# Patient Record
Sex: FEMALE | Race: BLACK OR AFRICAN AMERICAN | URBAN - METROPOLITAN AREA
[De-identification: names, ages, dates, MRNs, and addresses within clinical notes are randomized per-mention and may not be internally consistent; named-entity substitution may affect disease eponyms.]

---

## 2017-01-11 ENCOUNTER — HISTORICAL (OUTPATIENT)
Dept: ADMINISTRATIVE | Facility: HOSPITAL | Age: 82
End: 2017-01-11

## 2017-01-13 ENCOUNTER — HISTORICAL (OUTPATIENT)
Dept: ADMINISTRATIVE | Facility: HOSPITAL | Age: 82
End: 2017-01-13

## 2017-02-21 ENCOUNTER — HISTORICAL (OUTPATIENT)
Dept: ADMINISTRATIVE | Facility: HOSPITAL | Age: 82
End: 2017-02-21

## 2017-03-23 ENCOUNTER — HISTORICAL (OUTPATIENT)
Dept: RADIOLOGY | Facility: HOSPITAL | Age: 82
End: 2017-03-23

## 2017-03-31 ENCOUNTER — HISTORICAL (OUTPATIENT)
Dept: RADIOLOGY | Facility: HOSPITAL | Age: 82
End: 2017-03-31

## 2017-08-17 ENCOUNTER — HISTORICAL (OUTPATIENT)
Dept: LAB | Facility: HOSPITAL | Age: 82
End: 2017-08-17

## 2017-09-26 ENCOUNTER — HISTORICAL (OUTPATIENT)
Dept: RADIOLOGY | Facility: HOSPITAL | Age: 82
End: 2017-09-26

## 2018-04-18 ENCOUNTER — HISTORICAL (OUTPATIENT)
Dept: ADMINISTRATIVE | Facility: HOSPITAL | Age: 83
End: 2018-04-18

## 2018-05-25 ENCOUNTER — HISTORICAL (OUTPATIENT)
Dept: LAB | Facility: HOSPITAL | Age: 83
End: 2018-05-25

## 2018-07-05 ENCOUNTER — HISTORICAL (OUTPATIENT)
Dept: RADIOLOGY | Facility: HOSPITAL | Age: 83
End: 2018-07-05

## 2018-08-27 ENCOUNTER — HISTORICAL (OUTPATIENT)
Dept: LAB | Facility: HOSPITAL | Age: 83
End: 2018-08-27

## 2018-08-27 LAB
ABS NEUT (OLG): 3.3 X10(3)/MCL (ref 1.5–6.9)
ALBUMIN SERPL-MCNC: 3 GM/DL (ref 3.4–5)
ALBUMIN/GLOB SERPL: 0.7 RATIO
ALP SERPL-CCNC: 126 UNIT/L (ref 30–113)
ALT SERPL-CCNC: 19 UNIT/L (ref 10–45)
ANISOCYTOSIS BLD QL SMEAR: ABNORMAL
AST SERPL-CCNC: 17 UNIT/L (ref 15–37)
BILIRUB SERPL-MCNC: 0.3 MG/DL (ref 0.1–0.9)
BILIRUBIN DIRECT+TOT PNL SERPL-MCNC: 0.1 MG/DL (ref 0–0.3)
BILIRUBIN DIRECT+TOT PNL SERPL-MCNC: 0.2 MG/DL
BUN SERPL-MCNC: 30 MG/DL (ref 10–20)
CALCIUM SERPL-MCNC: 8.8 MG/DL (ref 8–10.5)
CHLORIDE SERPL-SCNC: 107 MMOL/L (ref 100–108)
CO2 SERPL-SCNC: 27 MMOL/L (ref 21–35)
CREAT SERPL-MCNC: 1.98 MG/DL (ref 0.7–1.3)
CREAT UR-MCNC: 136.8 MG/DL
ERYTHROCYTE [DISTWIDTH] IN BLOOD BY AUTOMATED COUNT: 17.5 % (ref 11.5–17)
GLOBULIN SER-MCNC: 4.3 GM/DL
GLUCOSE SERPL-MCNC: 118 MG/DL (ref 75–116)
HCT VFR BLD AUTO: 33.9 % (ref 36–48)
HGB BLD-MCNC: 10.7 GM/DL (ref 12–16)
HYPOCHROMIA BLD QL SMEAR: ABNORMAL
MCH RBC QN AUTO: 26 PG (ref 27–34)
MCHC RBC AUTO-ENTMCNC: 32 GM/DL (ref 31–36)
MCV RBC AUTO: 84 FL (ref 80–99)
PHOSPHATE SERPL-MCNC: 3.9 MG/DL (ref 2.6–4.7)
PLATELET # BLD AUTO: 144 X10(3)/MCL (ref 140–400)
PLATELET # BLD EST: ADEQUATE 10*3/UL
PMV BLD AUTO: 12 FL (ref 6.8–10)
POIKILOCYTOSIS BLD QL SMEAR: ABNORMAL
POTASSIUM SERPL-SCNC: 4 MMOL/L (ref 3.6–5.2)
PROT SERPL-MCNC: 7.3 GM/DL (ref 6.4–8.2)
PROT UR STRIP-MCNC: 15 MG/DL (ref 0–10)
RBC # BLD AUTO: 4.06 X10(6)/MCL (ref 4.2–5.4)
RBC MORPH BLD: ABNORMAL
SODIUM SERPL-SCNC: 144 MMOL/L (ref 135–145)
WBC # SPEC AUTO: 5.2 X10(3)/MCL (ref 4.5–11.5)

## 2018-08-31 ENCOUNTER — HISTORICAL (OUTPATIENT)
Dept: LAB | Facility: HOSPITAL | Age: 83
End: 2018-08-31

## 2018-08-31 LAB
ALBUMIN SERPL-MCNC: 3.2 GM/DL (ref 3.4–5)
ALBUMIN/GLOB SERPL: 0.7 RATIO
ALP SERPL-CCNC: 131 UNIT/L (ref 30–113)
ALT SERPL-CCNC: 21 UNIT/L (ref 10–45)
AST SERPL-CCNC: 16 UNIT/L (ref 15–37)
BILIRUB SERPL-MCNC: 0.2 MG/DL (ref 0.1–0.9)
BILIRUBIN DIRECT+TOT PNL SERPL-MCNC: 0.1 MG/DL
BILIRUBIN DIRECT+TOT PNL SERPL-MCNC: 0.1 MG/DL (ref 0–0.3)
BUN SERPL-MCNC: 34 MG/DL (ref 10–20)
CALCIUM SERPL-MCNC: 8.8 MG/DL (ref 8–10.5)
CHLORIDE SERPL-SCNC: 107 MMOL/L (ref 100–108)
CO2 SERPL-SCNC: 26 MMOL/L (ref 21–35)
CREAT SERPL-MCNC: 2.02 MG/DL (ref 0.7–1.3)
GLOBULIN SER-MCNC: 4.5 GM/DL
GLUCOSE SERPL-MCNC: 133 MG/DL (ref 75–116)
POTASSIUM SERPL-SCNC: 3.7 MMOL/L (ref 3.6–5.2)
PROT SERPL-MCNC: 7.7 GM/DL (ref 6.4–8.2)
SODIUM SERPL-SCNC: 144 MMOL/L (ref 135–145)

## 2018-09-27 ENCOUNTER — HISTORICAL (OUTPATIENT)
Dept: LAB | Facility: HOSPITAL | Age: 83
End: 2018-09-27

## 2018-09-27 LAB
ABS NEUT (OLG): 3.3 X10(3)/MCL (ref 1.5–6.9)
BASOPHILS # BLD AUTO: 0 X10(3)/MCL (ref 0–0.1)
BASOPHILS NFR BLD AUTO: 0 % (ref 0–1)
BUN SERPL-MCNC: 35 MG/DL (ref 10–20)
CALCIUM SERPL-MCNC: 8.4 MG/DL (ref 8–10.5)
CHLORIDE SERPL-SCNC: 105 MMOL/L (ref 100–108)
CO2 SERPL-SCNC: 26 MMOL/L (ref 21–35)
CREAT SERPL-MCNC: 1.99 MG/DL (ref 0.7–1.3)
CREAT/UREA NIT SERPL: 18
EOSINOPHIL # BLD AUTO: 0 X10(3)/MCL (ref 0–0.6)
EOSINOPHIL NFR BLD AUTO: 1 % (ref 0–5)
ERYTHROCYTE [DISTWIDTH] IN BLOOD BY AUTOMATED COUNT: 18 % (ref 11.5–17)
GLUCOSE SERPL-MCNC: 143 MG/DL (ref 75–116)
HCT VFR BLD AUTO: 35.8 % (ref 36–48)
HGB BLD-MCNC: 11.4 GM/DL (ref 12–16)
LYMPHOCYTES # BLD AUTO: 1.2 X10(3)/MCL (ref 0.5–4.1)
LYMPHOCYTES NFR BLD AUTO: 23.9 % (ref 15–40)
MCH RBC QN AUTO: 26 PG (ref 27–34)
MCHC RBC AUTO-ENTMCNC: 32 GM/DL (ref 31–36)
MCV RBC AUTO: 83 FL (ref 80–99)
MONOCYTES # BLD AUTO: 0.3 X10(3)/MCL (ref 0–1.1)
MONOCYTES NFR BLD AUTO: 6 % (ref 4–12)
NEUTROPHILS # BLD AUTO: 3.3 X10(3)/MCL (ref 1.5–6.9)
NEUTROPHILS NFR BLD AUTO: 68 % (ref 43–75)
PLATELET # BLD AUTO: 160 X10(3)/MCL (ref 140–400)
PLATELET # BLD EST: ADEQUATE 10*3/UL
PMV BLD AUTO: 11.9 FL (ref 6.8–10)
POTASSIUM SERPL-SCNC: 4.2 MMOL/L (ref 3.6–5.2)
RBC # BLD AUTO: 4.33 X10(6)/MCL (ref 4.2–5.4)
RBC MORPH BLD: NORMAL
SODIUM SERPL-SCNC: 142 MMOL/L (ref 135–145)
WBC # SPEC AUTO: 4.9 X10(3)/MCL (ref 4.5–11.5)

## 2019-01-18 ENCOUNTER — HISTORICAL (OUTPATIENT)
Dept: LAB | Facility: HOSPITAL | Age: 84
End: 2019-01-18

## 2019-02-19 ENCOUNTER — HISTORICAL (OUTPATIENT)
Dept: RADIOLOGY | Facility: HOSPITAL | Age: 84
End: 2019-02-19

## 2019-05-07 ENCOUNTER — HISTORICAL (OUTPATIENT)
Dept: LAB | Facility: HOSPITAL | Age: 84
End: 2019-05-07

## 2019-05-07 LAB
CREAT UR-MCNC: 112.6 MG/DL
EST. AVERAGE GLUCOSE BLD GHB EST-MCNC: 169 MG/DL
HBA1C MFR BLD: 7.5 % (ref 4.8–6)
MICROALBUMIN UR-MCNC: 1.8 MG/DL (ref 0.1–2)
MICROALBUMIN/CREAT RATIO PNL UR: 16 MG/GM CR (ref 0–30)

## 2019-06-07 ENCOUNTER — HISTORICAL (OUTPATIENT)
Dept: LAB | Facility: HOSPITAL | Age: 84
End: 2019-06-07

## 2019-06-07 LAB
ABS NEUT (OLG): 3.4 X10(3)/MCL (ref 1.5–6.9)
ALBUMIN SERPL-MCNC: 3.3 GM/DL (ref 3.4–5)
ALBUMIN/GLOB SERPL: 0.9 RATIO
ALP SERPL-CCNC: 156 UNIT/L (ref 30–113)
ALT SERPL-CCNC: 20 UNIT/L (ref 10–45)
AST SERPL-CCNC: 18 UNIT/L (ref 15–37)
BASOPHILS # BLD AUTO: 0 X10(3)/MCL (ref 0–0.1)
BASOPHILS NFR BLD AUTO: 0 % (ref 0–1)
BILIRUB SERPL-MCNC: 0.3 MG/DL (ref 0.1–0.9)
BILIRUBIN DIRECT+TOT PNL SERPL-MCNC: 0.1 MG/DL (ref 0–0.3)
BILIRUBIN DIRECT+TOT PNL SERPL-MCNC: 0.2 MG/DL
BUN SERPL-MCNC: 23 MG/DL (ref 10–20)
CALCIUM SERPL-MCNC: 8.9 MG/DL (ref 8–10.5)
CHLORIDE SERPL-SCNC: 107 MMOL/L (ref 100–108)
CO2 SERPL-SCNC: 27 MMOL/L (ref 21–35)
CREAT SERPL-MCNC: 1.44 MG/DL (ref 0.7–1.3)
EOSINOPHIL # BLD AUTO: 0.1 X10(3)/MCL (ref 0–0.6)
EOSINOPHIL NFR BLD AUTO: 3 % (ref 0–5)
ERYTHROCYTE [DISTWIDTH] IN BLOOD BY AUTOMATED COUNT: 16 % (ref 11.5–17)
GLOBULIN SER-MCNC: 3.8 GM/DL
GLUCOSE SERPL-MCNC: 181 MG/DL (ref 75–116)
HCT VFR BLD AUTO: 39.4 % (ref 36–48)
HGB BLD-MCNC: 12.7 GM/DL (ref 12–16)
IMM GRANULOCYTES # BLD AUTO: 0.02 10*3/UL (ref 0–0.02)
IMM GRANULOCYTES NFR BLD AUTO: 0.4 % (ref 0–0.43)
LYMPHOCYTES # BLD AUTO: 1.3 X10(3)/MCL (ref 0.5–4.1)
LYMPHOCYTES NFR BLD AUTO: 25 % (ref 15–40)
MCH RBC QN AUTO: 27 PG (ref 27–34)
MCHC RBC AUTO-ENTMCNC: 32 GM/DL (ref 31–36)
MCV RBC AUTO: 84 FL (ref 80–99)
MONOCYTES # BLD AUTO: 0.4 X10(3)/MCL (ref 0–1.1)
MONOCYTES NFR BLD AUTO: 7 % (ref 4–12)
NEUTROPHILS # BLD AUTO: 3.4 X10(3)/MCL (ref 1.5–6.9)
NEUTROPHILS NFR BLD AUTO: 65 % (ref 43–75)
PLATELET # BLD AUTO: 136 X10(3)/MCL (ref 140–400)
PLATELET # BLD EST: ABNORMAL 10*3/UL
PMV BLD AUTO: 11.1 FL (ref 6.8–10)
POTASSIUM SERPL-SCNC: 3.9 MMOL/L (ref 3.6–5.2)
PROT SERPL-MCNC: 7.1 GM/DL (ref 6.4–8.2)
RBC # BLD AUTO: 4.7 X10(6)/MCL (ref 4.2–5.4)
RBC MORPH BLD: NORMAL
SODIUM SERPL-SCNC: 143 MMOL/L (ref 135–145)
WBC # SPEC AUTO: 5.3 X10(3)/MCL (ref 4.5–11.5)

## 2019-11-07 ENCOUNTER — HISTORICAL (OUTPATIENT)
Dept: RADIOLOGY | Facility: HOSPITAL | Age: 84
End: 2019-11-07

## 2019-12-04 ENCOUNTER — HISTORICAL (OUTPATIENT)
Dept: RADIOLOGY | Facility: HOSPITAL | Age: 84
End: 2019-12-04

## 2019-12-04 LAB
ABS NEUT (OLG): 5.1 X10(3)/MCL (ref 1.5–6.9)
ALBUMIN SERPL-MCNC: 3.1 GM/DL (ref 3.4–5)
ALBUMIN/GLOB SERPL: 0.6 RATIO
ALP SERPL-CCNC: 142 UNIT/L (ref 30–113)
ALT SERPL-CCNC: 20 UNIT/L (ref 10–45)
AST SERPL-CCNC: 16 UNIT/L (ref 15–37)
BILIRUB SERPL-MCNC: 0.3 MG/DL (ref 0.1–0.9)
BILIRUBIN DIRECT+TOT PNL SERPL-MCNC: 0.1 MG/DL (ref 0–0.3)
BILIRUBIN DIRECT+TOT PNL SERPL-MCNC: 0.2 MG/DL
BUN SERPL-MCNC: 30 MG/DL (ref 10–20)
CALCIUM SERPL-MCNC: 9.1 MG/DL (ref 8–10.5)
CHLORIDE SERPL-SCNC: 107 MMOL/L (ref 100–108)
CO2 SERPL-SCNC: 29 MMOL/L (ref 21–35)
CREAT SERPL-MCNC: 1.73 MG/DL (ref 0.7–1.3)
ERYTHROCYTE [DISTWIDTH] IN BLOOD BY AUTOMATED COUNT: 15.2 % (ref 11.5–17)
GLOBULIN SER-MCNC: 4.9 GM/DL
GLUCOSE SERPL-MCNC: 149 MG/DL (ref 75–116)
HCT VFR BLD AUTO: 37.3 % (ref 36–48)
HGB BLD-MCNC: 11.9 GM/DL (ref 12–16)
MCH RBC QN AUTO: 27 PG (ref 27–34)
MCHC RBC AUTO-ENTMCNC: 32 GM/DL (ref 31–36)
MCV RBC AUTO: 86 FL (ref 80–99)
PLATELET # BLD AUTO: 198 X10(3)/MCL (ref 140–400)
PMV BLD AUTO: 10.5 FL (ref 6.8–10)
POTASSIUM SERPL-SCNC: 3.9 MMOL/L (ref 3.6–5.2)
PROT SERPL-MCNC: 8 GM/DL (ref 6.4–8.2)
RBC # BLD AUTO: 4.35 X10(6)/MCL (ref 4.2–5.4)
SODIUM SERPL-SCNC: 144 MMOL/L (ref 135–145)
WBC # SPEC AUTO: 7.6 X10(3)/MCL (ref 4.5–11.5)

## 2019-12-09 ENCOUNTER — HISTORICAL (OUTPATIENT)
Dept: LAB | Facility: HOSPITAL | Age: 84
End: 2019-12-09

## 2019-12-09 LAB
ALBUMIN SERPL-MCNC: 3 GM/DL (ref 3.4–5)
ALBUMIN/GLOB SERPL: 0.6 RATIO
ALP SERPL-CCNC: 120 UNIT/L (ref 30–113)
ALT SERPL-CCNC: 17 UNIT/L (ref 10–45)
AST SERPL-CCNC: 15 UNIT/L (ref 15–37)
BILIRUB SERPL-MCNC: 0.4 MG/DL (ref 0.1–0.9)
BILIRUBIN DIRECT+TOT PNL SERPL-MCNC: 0.1 MG/DL (ref 0–0.3)
BILIRUBIN DIRECT+TOT PNL SERPL-MCNC: 0.3 MG/DL
BUN SERPL-MCNC: 28 MG/DL (ref 10–20)
CALCIUM SERPL-MCNC: 9.6 MG/DL (ref 8–10.5)
CHLORIDE SERPL-SCNC: 104 MMOL/L (ref 100–108)
CHOLEST SERPL-MCNC: 161 MG/DL (ref 140–200)
CHOLEST/HDLC SERPL: 2 MG/DL (ref 0–8)
CO2 SERPL-SCNC: 26 MMOL/L (ref 21–35)
CREAT SERPL-MCNC: 1.88 MG/DL (ref 0.7–1.3)
GLOBULIN SER-MCNC: 4.7 GM/DL
GLUCOSE SERPL-MCNC: 121 MG/DL (ref 75–116)
HDLC SERPL-MCNC: 67 MG/DL (ref 35–59)
LDLC SERPL CALC-MCNC: 79 MG/DL (ref 100–129)
POTASSIUM SERPL-SCNC: 3.9 MMOL/L (ref 3.6–5.2)
PROT SERPL-MCNC: 7.7 GM/DL (ref 6.4–8.2)
SODIUM SERPL-SCNC: 143 MMOL/L (ref 135–145)
TRIGL SERPL-MCNC: 70 MG/DL (ref 35–150)
VLDLC SERPL CALC-MCNC: 14 MG/DL

## 2020-08-24 ENCOUNTER — HISTORICAL (OUTPATIENT)
Dept: LAB | Facility: HOSPITAL | Age: 85
End: 2020-08-24

## 2020-11-10 ENCOUNTER — HISTORICAL (OUTPATIENT)
Dept: ADMINISTRATIVE | Facility: HOSPITAL | Age: 85
End: 2020-11-10

## 2020-11-12 ENCOUNTER — HISTORICAL (OUTPATIENT)
Dept: SURGERY | Facility: HOSPITAL | Age: 85
End: 2020-11-12

## 2020-12-31 ENCOUNTER — HOSPITAL ENCOUNTER (OUTPATIENT)
Dept: MEDSURG UNIT | Facility: HOSPITAL | Age: 85
End: 2021-01-02
Admitting: NURSE PRACTITIONER

## 2020-12-31 LAB
ABS NEUT (OLG): 4.21 X10(3)/MCL (ref 2.1–9.2)
ALBUMIN SERPL-MCNC: 3.1 GM/DL (ref 3.4–4.8)
ALBUMIN/GLOB SERPL: 0.7 RATIO (ref 1.1–2)
ALP SERPL-CCNC: 151 UNIT/L (ref 40–150)
ALT SERPL-CCNC: 35 UNIT/L (ref 0–55)
AST SERPL-CCNC: 28 UNIT/L (ref 5–34)
BASOPHILS # BLD AUTO: 0 X10(3)/MCL (ref 0–0.2)
BASOPHILS NFR BLD AUTO: 0 %
BILIRUB SERPL-MCNC: 0.6 MG/DL
BILIRUBIN DIRECT+TOT PNL SERPL-MCNC: 0.3 MG/DL (ref 0–0.5)
BILIRUBIN DIRECT+TOT PNL SERPL-MCNC: 0.3 MG/DL (ref 0–0.8)
BNP BLD-MCNC: 123 PG/ML (ref 0–100)
BNP BLD-MCNC: 125.3 PG/ML
BUN SERPL-MCNC: 27.9 MG/DL (ref 9.8–20.1)
CALCIUM SERPL-MCNC: 8.8 MG/DL (ref 8.4–10.2)
CHLORIDE SERPL-SCNC: 103 MMOL/L (ref 98–107)
CO2 SERPL-SCNC: 25 MMOL/L (ref 23–31)
CREAT SERPL-MCNC: 1.83 MG/DL (ref 0.55–1.02)
EOSINOPHIL # BLD AUTO: 0.2 X10(3)/MCL (ref 0–0.9)
EOSINOPHIL NFR BLD AUTO: 3 %
ERYTHROCYTE [DISTWIDTH] IN BLOOD BY AUTOMATED COUNT: 15.8 % (ref 11.5–17)
GLOBULIN SER-MCNC: 4.7 GM/DL (ref 2.4–3.5)
GLUCOSE SERPL-MCNC: 149 MG/DL (ref 82–115)
HCT VFR BLD AUTO: 32.1 % (ref 37–47)
HGB BLD-MCNC: 10.4 GM/DL (ref 12–16)
LYMPHOCYTES # BLD AUTO: 0.9 X10(3)/MCL (ref 0.6–4.6)
LYMPHOCYTES NFR BLD AUTO: 15 %
MCH RBC QN AUTO: 27.5 PG (ref 27–31)
MCHC RBC AUTO-ENTMCNC: 32.4 GM/DL (ref 33–36)
MCV RBC AUTO: 84.9 FL (ref 80–94)
MONOCYTES # BLD AUTO: 0.7 X10(3)/MCL (ref 0.1–1.3)
MONOCYTES NFR BLD AUTO: 11 %
NEUTROPHILS # BLD AUTO: 4.21 X10(3)/MCL (ref 2.1–9.2)
NEUTROPHILS NFR BLD AUTO: 71 %
PLATELET # BLD AUTO: 172 X10(3)/MCL (ref 130–400)
PMV BLD AUTO: 11.3 FL (ref 9.4–12.4)
POC TROPONIN: 0.01 NG/ML (ref 0–0.08)
POTASSIUM SERPL-SCNC: 3.9 MMOL/L (ref 3.5–5.1)
PROT SERPL-MCNC: 7.8 GM/DL (ref 5.8–7.6)
RBC # BLD AUTO: 3.78 X10(6)/MCL (ref 4.2–5.4)
SODIUM SERPL-SCNC: 142 MMOL/L (ref 136–145)
TROPONIN I SERPL-MCNC: 0.01 NG/ML (ref 0–0.04)
WBC # SPEC AUTO: 6 X10(3)/MCL (ref 4.5–11.5)

## 2021-01-01 LAB
CK MB SERPL-MCNC: 1.9 NG/ML
CK MB SERPL-MCNC: 2.1 NG/ML
CK SERPL-CCNC: 179 U/L (ref 29–168)
CK SERPL-CCNC: 192 U/L (ref 29–168)
SARS-COV-2 RNA RESP QL NAA+PROBE: NOT DETECTED
TROPONIN I SERPL-MCNC: 0.01 NG/ML (ref 0–0.04)
TROPONIN I SERPL-MCNC: 0.01 NG/ML (ref 0–0.04)

## 2021-01-02 LAB
BUN SERPL-MCNC: 26 MG/DL (ref 9.8–20.1)
CALCIUM SERPL-MCNC: 8.1 MG/DL (ref 8.4–10.2)
CHLORIDE SERPL-SCNC: 104 MMOL/L (ref 98–107)
CO2 SERPL-SCNC: 22 MMOL/L (ref 23–31)
CREAT SERPL-MCNC: 1.6 MG/DL (ref 0.55–1.02)
CREAT/UREA NIT SERPL: 16
EST CREAT CLEARANCE SER (OHS): 21.64 ML/MIN
GLUCOSE SERPL-MCNC: 109 MG/DL (ref 82–115)
POTASSIUM SERPL-SCNC: 4.7 MMOL/L (ref 3.5–5.1)
RESTICK: NORMAL
SODIUM SERPL-SCNC: 139 MMOL/L (ref 136–145)

## 2021-01-15 ENCOUNTER — HISTORICAL (OUTPATIENT)
Dept: ADMINISTRATIVE | Facility: HOSPITAL | Age: 86
End: 2021-01-15

## 2021-01-15 LAB — SARS-COV-2 RNA RESP QL NAA+PROBE: NOT DETECTED

## 2021-01-19 ENCOUNTER — HISTORICAL (OUTPATIENT)
Dept: SURGERY | Facility: HOSPITAL | Age: 86
End: 2021-01-19

## 2021-03-30 ENCOUNTER — HISTORICAL (OUTPATIENT)
Dept: LAB | Facility: HOSPITAL | Age: 86
End: 2021-03-30

## 2021-03-30 LAB
ABS NEUT (OLG): 3.2 X10(3)/MCL (ref 1.5–6.9)
ALBUMIN SERPL-MCNC: 3.5 GM/DL (ref 3.4–4.8)
ALBUMIN/GLOB SERPL: 0.9 RATIO (ref 1.1–2)
ALP SERPL-CCNC: 112 UNIT/L (ref 40–150)
ALT SERPL-CCNC: 17 UNIT/L (ref 0–55)
AST SERPL-CCNC: 15 UNIT/L (ref 5–34)
BASOPHILS # BLD AUTO: 0 X10(3)/MCL (ref 0–0.1)
BASOPHILS NFR BLD AUTO: 0 % (ref 0–1)
BILIRUB SERPL-MCNC: 0.3 MG/DL
BILIRUBIN DIRECT+TOT PNL SERPL-MCNC: 0.1 MG/DL (ref 0–0.5)
BILIRUBIN DIRECT+TOT PNL SERPL-MCNC: 0.2 MG/DL (ref 0–0.8)
BUN SERPL-MCNC: 32 MG/DL (ref 9.8–20.1)
CALCIUM SERPL-MCNC: 9.1 MG/DL (ref 8.4–10.2)
CHLORIDE SERPL-SCNC: 107 MMOL/L (ref 98–107)
CHOLEST SERPL-MCNC: 157 MG/DL
CHOLEST/HDLC SERPL: 3 {RATIO} (ref 0–5)
CO2 SERPL-SCNC: 27 MMOL/L (ref 23–31)
CREAT SERPL-MCNC: 1.94 MG/DL (ref 0.55–1.02)
CREAT UR-MCNC: 93.2 MG/DL (ref 45–106)
EOSINOPHIL # BLD AUTO: 0.3 X10(3)/MCL (ref 0–0.6)
EOSINOPHIL NFR BLD AUTO: 5 % (ref 0–5)
ERYTHROCYTE [DISTWIDTH] IN BLOOD BY AUTOMATED COUNT: 16.5 % (ref 11.5–17)
EST. AVERAGE GLUCOSE BLD GHB EST-MCNC: 203 MG/DL
GLOBULIN SER-MCNC: 4.1 GM/DL (ref 2.4–3.5)
GLUCOSE SERPL-MCNC: 104 MG/DL (ref 82–115)
HBA1C MFR BLD: 8.7 %
HCT VFR BLD AUTO: 37.5 % (ref 36–48)
HDLC SERPL-MCNC: 53 MG/DL (ref 35–60)
HGB BLD-MCNC: 11.8 GM/DL (ref 12–16)
IMM GRANULOCYTES # BLD AUTO: 0.01 10*3/UL (ref 0–0.02)
IMM GRANULOCYTES NFR BLD AUTO: 0.2 % (ref 0–0.43)
LDLC SERPL CALC-MCNC: 80 MG/DL (ref 50–140)
LYMPHOCYTES # BLD AUTO: 1.3 X10(3)/MCL (ref 0.5–4.1)
LYMPHOCYTES NFR BLD AUTO: 25 % (ref 15–40)
MCH RBC QN AUTO: 27 PG (ref 27–34)
MCHC RBC AUTO-ENTMCNC: 32 GM/DL (ref 31–36)
MCV RBC AUTO: 86 FL (ref 80–99)
MICROALBUMIN UR-MCNC: 109.9 UG/ML
MICROALBUMIN/CREAT RATIO PNL UR: 117.9 MG/GM CR (ref 0–30)
MONOCYTES # BLD AUTO: 0.4 X10(3)/MCL (ref 0–1.1)
MONOCYTES NFR BLD AUTO: 8 % (ref 4–12)
NEUTROPHILS # BLD AUTO: 3.2 X10(3)/MCL (ref 1.5–6.9)
NEUTROPHILS NFR BLD AUTO: 61 % (ref 43–75)
PLATELET # BLD AUTO: 175 X10(3)/MCL (ref 140–400)
PMV BLD AUTO: 11.3 FL (ref 6.8–10)
POTASSIUM SERPL-SCNC: 4.1 MMOL/L (ref 3.5–5.1)
PROT SERPL-MCNC: 7.6 GM/DL (ref 5.8–7.6)
RBC # BLD AUTO: 4.36 X10(6)/MCL (ref 4.2–5.4)
SODIUM SERPL-SCNC: 143 MMOL/L (ref 136–145)
TRIGL SERPL-MCNC: 120 MG/DL (ref 37–140)
VLDLC SERPL CALC-MCNC: 24 MG/DL
WBC # SPEC AUTO: 5.3 X10(3)/MCL (ref 4.5–11.5)

## 2021-04-06 ENCOUNTER — HISTORICAL (OUTPATIENT)
Dept: LAB | Facility: HOSPITAL | Age: 86
End: 2021-04-06

## 2021-04-06 LAB
ABS NEUT (OLG): 3.5 X10(3)/MCL (ref 1.5–6.9)
ALBUMIN SERPL-MCNC: 3.7 GM/DL (ref 3.4–4.8)
ALBUMIN/GLOB SERPL: 0.9 RATIO (ref 1.1–2)
ALP SERPL-CCNC: 138 UNIT/L (ref 40–150)
ALT SERPL-CCNC: 18 UNIT/L (ref 0–55)
AST SERPL-CCNC: 13 UNIT/L (ref 5–34)
BASOPHILS # BLD AUTO: 0 X10(3)/MCL (ref 0–0.1)
BASOPHILS NFR BLD AUTO: 0 % (ref 0–1)
BILIRUB SERPL-MCNC: 0.4 MG/DL
BILIRUBIN DIRECT+TOT PNL SERPL-MCNC: 0.2 MG/DL (ref 0–0.5)
BILIRUBIN DIRECT+TOT PNL SERPL-MCNC: 0.2 MG/DL (ref 0–0.8)
BUN SERPL-MCNC: 35 MG/DL (ref 9.8–20.1)
CALCIUM SERPL-MCNC: 9.5 MG/DL (ref 8.4–10.2)
CHLORIDE SERPL-SCNC: 106 MMOL/L (ref 98–107)
CHOLEST SERPL-MCNC: 179 MG/DL
CHOLEST/HDLC SERPL: 4 {RATIO} (ref 0–5)
CO2 SERPL-SCNC: 29 MMOL/L (ref 23–31)
CREAT SERPL-MCNC: 1.87 MG/DL (ref 0.55–1.02)
CREAT UR-MCNC: 120.4 MG/DL (ref 45–106)
EOSINOPHIL # BLD AUTO: 0.3 X10(3)/MCL (ref 0–0.6)
EOSINOPHIL NFR BLD AUTO: 4 % (ref 0–5)
ERYTHROCYTE [DISTWIDTH] IN BLOOD BY AUTOMATED COUNT: 16.1 % (ref 11.5–17)
GLOBULIN SER-MCNC: 4.1 GM/DL (ref 2.4–3.5)
GLUCOSE SERPL-MCNC: 125 MG/DL (ref 82–115)
HCT VFR BLD AUTO: 40.9 % (ref 36–48)
HDLC SERPL-MCNC: 42 MG/DL (ref 35–60)
HGB BLD-MCNC: 12.4 GM/DL (ref 12–16)
IMM GRANULOCYTES # BLD AUTO: 0.04 10*3/UL (ref 0–0.02)
IMM GRANULOCYTES NFR BLD AUTO: 0.6 % (ref 0–0.43)
LDLC SERPL CALC-MCNC: 91 MG/DL (ref 50–140)
LYMPHOCYTES # BLD AUTO: 2.7 X10(3)/MCL (ref 0.5–4.1)
LYMPHOCYTES NFR BLD AUTO: 38 % (ref 15–40)
MCH RBC QN AUTO: 26 PG (ref 27–34)
MCHC RBC AUTO-ENTMCNC: 30 GM/DL (ref 31–36)
MCV RBC AUTO: 87 FL (ref 80–99)
MONOCYTES # BLD AUTO: 0.6 X10(3)/MCL (ref 0–1.1)
MONOCYTES NFR BLD AUTO: 8 % (ref 4–12)
NEUTROPHILS # BLD AUTO: 3.5 X10(3)/MCL (ref 1.5–6.9)
NEUTROPHILS NFR BLD AUTO: 50 % (ref 43–75)
PHOSPHATE SERPL-MCNC: 3.7 MG/DL (ref 2.3–4.7)
PLATELET # BLD AUTO: 199 X10(3)/MCL (ref 140–400)
PMV BLD AUTO: 11.6 FL (ref 6.8–10)
POTASSIUM SERPL-SCNC: 3.6 MMOL/L (ref 3.5–5.1)
PROT SERPL-MCNC: 7.8 GM/DL (ref 5.8–7.6)
PROT UR STRIP-MCNC: 44 MG/DL
PROT/CREAT UR-RTO: 365 MG/DL
RBC # BLD AUTO: 4.71 X10(6)/MCL (ref 4.2–5.4)
SODIUM SERPL-SCNC: 145 MMOL/L (ref 136–145)
TRIGL SERPL-MCNC: 229 MG/DL (ref 37–140)
TSH SERPL-ACNC: 3.96 UIU/ML (ref 0.35–4.94)
VLDLC SERPL CALC-MCNC: 46 MG/DL
WBC # SPEC AUTO: 7.1 X10(3)/MCL (ref 4.5–11.5)

## 2021-05-21 ENCOUNTER — HISTORICAL (OUTPATIENT)
Dept: LAB | Facility: HOSPITAL | Age: 86
End: 2021-05-21

## 2021-05-21 LAB
ALBUMIN SERPL-MCNC: 3.6 GM/DL (ref 3.4–4.8)
ALBUMIN/GLOB SERPL: 1 RATIO (ref 1.1–2)
ALP SERPL-CCNC: 125 UNIT/L (ref 40–150)
ALT SERPL-CCNC: 18 UNIT/L (ref 0–55)
AST SERPL-CCNC: 14 UNIT/L (ref 5–34)
BILIRUB SERPL-MCNC: 0.3 MG/DL
BILIRUBIN DIRECT+TOT PNL SERPL-MCNC: 0.1 MG/DL (ref 0–0.5)
BILIRUBIN DIRECT+TOT PNL SERPL-MCNC: 0.2 MG/DL (ref 0–0.8)
BNP BLD-MCNC: 262.3 PG/ML
BUN SERPL-MCNC: 38 MG/DL (ref 9.8–20.1)
CALCIUM SERPL-MCNC: 9.2 MG/DL (ref 8.4–10.2)
CHLORIDE SERPL-SCNC: 105 MMOL/L (ref 98–107)
CHOLEST SERPL-MCNC: 155 MG/DL
CHOLEST/HDLC SERPL: 4 {RATIO} (ref 0–5)
CO2 SERPL-SCNC: 31 MMOL/L (ref 23–31)
CREAT SERPL-MCNC: 2.21 MG/DL (ref 0.55–1.02)
GLOBULIN SER-MCNC: 3.7 GM/DL (ref 2.4–3.5)
GLUCOSE SERPL-MCNC: 84 MG/DL (ref 82–115)
HDLC SERPL-MCNC: 42 MG/DL (ref 35–60)
LDLC SERPL CALC-MCNC: 82 MG/DL (ref 50–140)
POTASSIUM SERPL-SCNC: 4.2 MMOL/L (ref 3.5–5.1)
PROT SERPL-MCNC: 7.3 GM/DL (ref 5.8–7.6)
SODIUM SERPL-SCNC: 146 MMOL/L (ref 136–145)
TRIGL SERPL-MCNC: 153 MG/DL (ref 37–140)
VLDLC SERPL CALC-MCNC: 31 MG/DL

## 2021-12-14 ENCOUNTER — HISTORICAL (OUTPATIENT)
Dept: ADMINISTRATIVE | Facility: HOSPITAL | Age: 86
End: 2021-12-14

## 2022-04-09 ENCOUNTER — HISTORICAL (OUTPATIENT)
Dept: ADMINISTRATIVE | Facility: HOSPITAL | Age: 87
End: 2022-04-09

## 2022-04-26 VITALS
DIASTOLIC BLOOD PRESSURE: 77 MMHG | WEIGHT: 191.81 LBS | BODY MASS INDEX: 36.21 KG/M2 | HEIGHT: 61 IN | SYSTOLIC BLOOD PRESSURE: 123 MMHG

## 2022-04-30 NOTE — H&P
Patient:   Ina Marina            MRN: [id number]            FIN: [id number]               Age:   85 years     Sex:  Female     :  1935   Associated Diagnoses:   None   Author:   Doug RUSHING, Matthew MABRY      Basic Information   Source of history:  Self, Medical record.    Present at bedside:  Family member.    Referral source:  Emergency department.    History limitation:  None.       Chief Complaint       2020 20:52 CST     Complaint of chest pain, with SOB.  Symptoms started this am, worse tonight.    2020 9:00 CST      S/p CE/IOL OS (2020).  MRx then DFE OS.    2020 8:54 CST      S/p CE/IOL OS (2020).  MRx then DFE OS.        History of Present Illness   85 year old BF with history of HTN, HLD, DM, CKD (sees Dr. Hatfield), CAD with prior MI (reports prior stents LAD and Circumflex) - coronary angiography in  showed 50% proximal LAD, 90% distal LAD, 80% stenosis of a small OM, small RCA with  in mid segment.  Also with valvular heart disease with echo in 2020 revealing moderate AS (39/22 mm Hg) with concurrent mild AI and mild to moderate TR (RVSP 44 mm Hg).  Reported history of CHF (LVEF 55-60% by echo in 2020).    Patient noted acute onset of unprovoked moderate to severe intensity right sided chest discomfort characterized as pressure with associated SOB last PM with character of discomfort similar to prior angina.  Workup in ER revealed elevated creatinine from baseline (now 1.83), hemoglobin 10.4, troponin negative x 2.  EKG showed NSR with NSSTWA.  BNP was mildly elevated (125).  Patient denies recurrent discomfort since treatment in ER which included NTG paste and supplemental O2.  Family asking if she can be discharged home with supplemental O2.      Review of Systems   Constitutional:  Fatigue, No fever, No chills.    Eye:  Negative.    Ear/Nose/Mouth/Throat:  Negative.    Respiratory:  Shortness of breath, No cough, No hemoptysis.    Cardiovascular:   Peripheral edema, No palpitations.         Chest pain: Right sided.    Gastrointestinal:  Negative.    Genitourinary:  Negative.    Hematology/Lymphatics:  Negative.    Endocrine:  Negative.    Immunologic:  Negative.    Musculoskeletal:  No claudication, No trauma.    Integumentary:  Negative.    Neurologic:  Negative.    Psychiatric:  Negative.       Health Status   Allergies:    Allergic Reactions (Selected)  No Known Allergies,    Allergies (1) Active Reaction  No Known Allergies None Documented     Current medications:  (Selected)   Inpatient Medications  Ordered  Benadryl: 25 mg, form: Cap, Oral, Once a day (at bedtime) PRN for insomnia, first dose 01/01/21 3:30:00 CST  Benadryl: 25 mg, form: Cap, Oral, q4hr PRN for itching, first dose 01/01/21 3:30:00 CST  Maalox Antacid with Anti-Gas: 30 mL, form: Susp, Oral, q4hr PRN for dyspepsia, first dose 01/01/21 3:30:00 CST  Milk of Magnesia: 30 mL, form: Susp, Oral, Daily PRN for constipation, first dose 01/01/21 3:30:00 CST  Norco  5 mg-325 mg oral tablet: 1 tab(s), form: Tab, Oral, q4hr PRN for pain, moderate, first dose 01/01/21 3:30:00 CST  Norco  5 mg-325 mg oral tablet: 2 tab(s), form: Tab, Oral, q4hr PRN for pain, severe, first dose 01/01/21 3:30:00 CST  Phenergan: 12.5 mg, form: Injection, IM, q6hr PRN for nausea/vomiting, first dose 01/01/21 3:30:00 CST  Senokot S: 1 tab(s), form: Tab, Oral, BID PRN for constipation, first dose 01/01/21 3:30:00 CST, choose only if unrelieved by Milk of Magnesia or choose first if ordered alone  Tylenol: 1,000 mg, form: Tab, Oral, q6hr PRN for pain, mild, first dose 01/01/21 3:30:00 CST, or fever; No more than 4 grams in 24 hours  Tylenol: 650 mg, form: Supp, [place], q6hr PRN for pain, first dose 01/01/21 3:30:00 CST, mild or fever if patient unable to swallow; No more than 4 grams in 24 hours  Xanax: 0.25 mg, form: Tab, Oral, q6hr PRN for anxiety, first dose 01/01/21 3:30:00 CST  Xylocaine Jelly 2% topical gel with applicator: 5  mL, form: Gel, TOP, Once PRN for other (see comment), first dose 01/01/21 3:30:00 CST, for insertion of urinary catheter in males  Zofran: 4 mg, form: Injection, IV, q8hr PRN for nausea/vomiting, first dose 01/01/21 3:30:00 CST  aspirin: 325 mg, form: Tab, Oral, Daily, first dose 01/01/21 3:30:00 CST, STAT  hydrALAZINE: 10 mg, form: Injection, IV Push, q2hr PRN for hypertension, first dose 01/01/21 3:30:00 CST, STAT, SBP > 160mmHg or DBP > 100 mmHg, if HR < 60 or when BP does not respond to Labetolol  labetalol: 10 mg, form: Soln, IV Push, q1hr PRN for hypertension, first dose 01/01/21 3:30:00 CST, STAT, SBP > 160mmHg or DBP > 110 mmHg, may repeat hourly as necessary if HR > 60  nitroglycerin 2% transdermal ointment: 0.5 in, form: Ointment, TOP, [id number], first dose 01/01/21 3:30:00 CST  nitroglycerin: 0.4 mg, form: Tab-SL, SL, q5min PRN for chest pain, Order duration: 3 dose(s), first dose 01/01/21 3:30:00 CST, stop date Limited # of times, if systolic BP > 100 until pain relieved of at level 1  Documented Medications  Documented  AMLODIPINE BESYLATE 10 MG TAB: 10 mg = 1 tab(s), Oral, BID  Aspir-Low 81 mg oral delayed release tablet: 0 Refill(s)  CLOPIDOGREL 75 MG TABLET: 75 mg = 1 tab(s), Oral, Daily  DONEPEZIL HCL 10 MG TABLET: 10 mg = 1 tab(s), Oral, Daily  Fergon 240 mg (27 mg elemental iron) oral tablet: 240 mg = 1 tab(s), Oral, Daily, # 30 tab(s), 0 Refill(s)  Fluzone High-Dose Quadrivalent 8823-8254 intramuscular suspension: 0.7 mL, IM, Once  GABAPENTIN 300 MG CAPSULE: 300 mg = 1 cap(s), Oral, TID  ISOSORBIDE MN ER 30 MG TABLET: 30 mg = 1 tab(s), Oral, Daily  KLOR-CON M20 TABLET: 20 mEq = 1 tab(s), Oral, Daily  LOSARTAN POTASSIUM 100 MG TAB: 100 mg = 1 tab(s), Oral, BID  Levemir FlexTouch 100 units/mL subcutaneous solution: Subcutaneous  Nitrostat 0.4 mg sublingual tab: 0.4 mg = 1 tab(s), SL, q5min, PRN PRN for chest pain, # 100 tab(s), 0 Refill(s)  PANTOPRAZOLE SOD DR 40 MG TAB: 40 mg = 1 tab(s), Oral,  Daily  ROSUVASTATIN CALCIUM 20 MG TAB: 20 mg = 1 tab(s), Oral, Daily  Sudafed: See Instructions, Oral q6hr, 0 Refill(s)  Vitamin D3 2000 intl units (50 mcg) oral capsule: 2,000 IntUnit = 1 cap(s), Oral, Daily, # 60 cap(s), 0 Refill(s)  doxepin 100 mg oral capsule: See Instructions, 2 cap(s) Oral Once a day (at bedtime), 0 Refill(s)  glimepiride 4 mg oral tablet: 4 mg = 1 tab(s), Oral, qAM  montelukast 10 mg oral TABLET: 10 mg = 1 tab(s), Oral, Daily, 0 Refill(s)  risperidone 2 mg oral tablet: 2 mg = 1 tab(s), Oral, BID  torsemide 20 mg oral tablet: 40 mg = 2 tab(s), Oral, Daily  traMADol 50 mg oral tablet: 50 mg = 1 tab(s), Oral, q6hr, PRN PRN for pain, # 60 tab(s), 0 Refill(s)      Histories   Past Medical History:    Resolved  Heart attack (90328912):  Resolved.  CHF (congestive heart failure) ([id number]-[id number]):  Resolved.  Diabetes ([id number]):  Resolved.  Renal disease ([id number]-[id number]):  Resolved.  Breast cancer ([id number]):  Resolved.  Hypercholesteremia ([id number]-V62T-06F6G0P16A32):  Resolved.  Hypertension (JW88E9H6--R8E0-N9ZP6BQ08M26):  Resolved.   Family History:    No family history items have been selected or recorded.   Procedure history:    Cataract Extraction Phacoemulsification (Left) on 11/12/2020 at 85 Years.  Comments:  11/12/2020 9:55 SUMI - Nathen BARRIENTOS, Vicky  auto-populated from documented surgical case  IOL Placement (Left) on 11/12/2020 at 85 Years.  Comments:  11/12/2020 9:55 CST - Nathen BARRIENTOS, Vicky  auto-populated from documented surgical case  Angiogram ([id number]).  Mastectomy (1325635718).  Appendectomy (396430936).  Stent placement (712931318).  Endovenous laser ablation of varicose vein of lower limb ([id number]).   Social History        Social & Psychosocial Habits    Alcohol  06/30/2014 Risk Assessment: Denies Alcohol Use      Comment: denhina - 07/10/2016 11:45 -  Bela Prabhakar RN    01/01/2021  Use: Never    Home/Environment  01/03/2019  Lives with: Spouse    Sexual  11/20/2020  What is your current gender identity? (Check all that apply) Identifies as female    Substance Use  06/30/2014 Risk Assessment: Denies Substance Abuse      Comment: denies - 07/10/2016 11:45 - Aki BARRIENTOS, Bela    Tobacco  06/30/2014 Risk Assessment: Denies Tobacco Use    12/31/2020  Use: Never (less than 100 in l    Patient Wants Consult For Cessation Counseling N/A    01/01/2021  Use: Never (less than 100 in l    Patient Wants Consult For Cessation Counseling No    Abuse/Neglect  12/31/2020  SHX Any signs of abuse or neglect No    01/01/2021  SHX Any signs of abuse or neglect No    Feels unsafe at home: No    Safe place to go: Yes  .        Physical Examination      Vital Signs (last 24 hrs)_____  Last Charted___________  Temp Oral     36.3 DegC  (JAN 01 07:43)  Heart Rate Peripheral   68 bpm  (JAN 01 07:43)  Resp Rate         18 br/min  (JAN 01 07:43)  SBP      134 mmHg  (JAN 01 07:43)  DBP      80 mmHg  (JAN 01 07:43)  SpO2      95 %  (JAN 01 07:43)  Weight      91.9 kg  (JAN 01 03:14)  Height      160 cm  (JAN 01 03:14)  BMI      35.9  (JAN 01 03:14)     General:  Alert and oriented, No acute distress.    Eye:  Extraocular movements are intact, Normal conjunctiva.    HENT:  Normocephalic, Normal hearing, Oral mucosa is moist.    Neck:  Supple, No carotid bruit, No jugular venous distention.    Respiratory:  Lungs are clear to auscultation, Respirations are non-labored, Breath sounds are equal, Symmetrical chest wall expansion.    Cardiovascular:  Normal rate, Regular rhythm, Normal peripheral perfusion, trace LE edema, 3/6 AMANDA at RUSB and 2/6 HSM at base.    Gastrointestinal:  Soft, Non-tender, Non-distended.    Integumentary:  Warm, Dry.    Neurologic:  Alert, Oriented, No focal deficits.    Psychiatric:  Cooperative, Appropriate mood & affect.       Review / Management   Laboratory Results    Today's Lab Results : PowerNote Discrete Results   1/1/2021 6:00 CST        Blood Glucose, POC        109 mg/dL  HI    1/1/2021 5:00 CST        Blood Glucose Testing Reason              Routine                             Blood Glucose Stick Site  Finger                             Blood Glucose Interventions               Give 8 oz. juice (CBG 40-60 mg/dl)                             Blood Glucose Source      Capillary                             Blood Glucose, POC        49 mg/dL  LOW    1/1/2021 0:16 CST        SARS-CoV-2 PCR            Not Detected    12/31/2020 22:09 CST     POC Troponin              0.01 ng/mL    12/31/2020 22:04 CST     POC BNP iSTAT             123 pg/mL  HI    12/31/2020 21:54 CST     Est Creat Clearance Ser   18.92 mL/min    12/31/2020 21:15 CST     Blood Glucose, POC        105 mg/dL    12/31/2020 21:08 CST     WBC                       6.0 x10(3)/mcL                             RBC                       3.78 x10(6)/mcL  LOW                             Hgb                       10.4 gm/dL  LOW                             Hct                       32.1 %  LOW                             Platelet                  172 x10(3)/mcL                             MCV                       84.9 fL                             MCH                       27.5 pg                             MCHC                      32.4 gm/dL  LOW                             RDW                       15.8 %                             MPV                       11.3 fL                             Abs Neut                  4.21 x10(3)/mcL                             Neutro Auto               71 %  NA                             Lymph Auto                15 %  NA                             Mono Auto                 11 %  NA                             Eos Auto                  3 %  NA                             Abs Eos                   0.2 x10(3)/mcL                             Basophil Auto             0 %  NA                              Abs Neutro                4.21 x10(3)/mcL                             Abs Lymph                 0.9 x10(3)/mcL                             Abs Mono                  0.7 x10(3)/mcL                             Abs Baso                  0.0 x10(3)/mcL                             Sodium Lvl                142 mmol/L                             Potassium Lvl             3.9 mmol/L                             Chloride                  103 mmol/L                             CO2                       25 mmol/L                             Calcium Lvl               8.8 mg/dL                             Glucose Lvl               149 mg/dL  HI                             BUN                       27.9 mg/dL  HI                             Creatinine                1.83 mg/dL  HI                             eGFR-AA                   34  NA                             eGFR-ELI                  28 mL/min/1.73 m2  NA                             Bili Total                0.6 mg/dL                             Bili Direct               0.3 mg/dL                             Bili Indirect             0.30 mg/dL                             AST                       28 unit/L                             ALT                       35 unit/L                             Alk Phos                  151 unit/L  HI                             Total Protein             7.8 gm/dL  HI                             Albumin Lvl               3.1 gm/dL  LOW                             Globulin                  4.7 gm/dL  HI                             A/G Ratio                 0.7 ratio  LOW                             BNP                       125.3 pg/mL  HI                             Troponin-I                0.015 ng/mL        Radiology results   Rad Results Last 72 Hrs   Accession: [id number]  Order: XR Chest 2 Views  Report Dt/Tm: 01/01/2021 09:30  Report:      CHEST X-RAYS (2 Views):     CPT: 90119     HISTORY:  Chest Pain      FINDINGS:  Examination reveals mediastinal silhouette to be within normal limits  cardiac silhouette is at upper limits of normal there is slight  blunting of the costophrenic angles and posterior sulci which might be  related to small pleural effusions and/or chronic pleural disease.     No focal consolidative changes.     IMPRESSION: Blunting of the costophrenic angles most probably chronic  in nature since it was seen on the previous exam of May 20, 2018.     Otherwise no active pulmonary disease       Cardiac Monitor:  Reveals a Normal sinus rhythm.    ECG interpretation:  Normal sinus rhythm, NSSTWA.    Documentation reviewed:  Records from referring physician, Reviewed prior records, Flowsheet, Reviewed home medications.    Condition:  Stable.       Impression and Plan   1.  Acute onset chest discomfort with associated SOB similar to prior angina noting nonrevascularizable obstructive CAD by angiography in 1026 ( of small RCA and 90% distal LAD and 80% small OM in addition to 50% proximal LAD stenosis.  Reassuring that no acute EKG changes present and troponin negative x 2.  Will attempt to treat medically with continuation of Imdur and addition of beta blocker therapy.  Can consider addition of Ranexa in the future if needed.  May require invasive risk stratification if symptoms recur.  2.  Valvular heart disease with moderate AS by echo in 7/2020.  Repeat echo to ensure AS has not progressed to severe given new onset chest discomfort with SOB.  3.  HTN.  BP elevated.  Resume home meds + add beta blocker.  D/c Sudafed.  4.  HLD.  Continue statin.  5.  DM.  Continue medical therapy.  6.  CHF.  EF normal by echo in 7/2020.  Suspect diastolic dysfunction.  Does not appear volume overloaded by exam.  Continue oral torsemide.

## 2022-04-30 NOTE — ED PROVIDER NOTES
"   Patient:   Ina Marina            MRN: [id number]            FIN: [id number]               Age:   85 years     Sex:  Female     :  1935   Associated Diagnoses:   Chest pain   Author:   Sterling Zafar MD      Basic Information   Time seen: Date & time 2020 20:54:00.   History source: Patient.   Arrival mode: Private vehicle.   History limitation: None.   Additional information: Patient's physician(s): LANI Zuñiga MD, Chief Complaint from Nursing Triage Note : Chief Complaint   2020 9:00 CST      Chief Complaint           S/p CE/IOL OS (2020).  MRx then DFE OS.    2020 8:54 CST      Chief Complaint           S/p CE/IOL OS (2020).  MRx then DFE OS.  .   Provider/Visit info:    No qualifying data available.   .   History of Present Illness   The patient presents with 84Y/O F presents to the ED with c/o chest pain.Linh BOWDEN-C and     I, Dr. Zafar, assumed care of this patient upon entering the room at 2344.    86 y/o AAF with a hx of MI, CAD, DM, CHF, and hypercholesteremia presents to the ED with family at bedside for right sided chest pain that started around 1100 this morning. Pt reports a "pressure" in her chest that feels similar to a MI years ago. She denies cough or fever..  The onset was 2020 11:00:00 .  The course/duration of symptoms is constant.  Location: Right chest. Radiating pain: none. The character of symptoms is pressure.  The degree at onset was moderate.  The degree at maximum was moderate.  The degree at present is moderate.  The exacerbating factor is none.  The relieving factor is none.  Risk factors consist of coronary artery disease, hypertension and hyperlipidemia.  Prior episodes: Previous MI years ago..  Therapy today None.  Associated symptoms: chest pain, denies cough and denies fever.        Review of Systems   Constitutional symptoms:  Denies fever.   Skin symptoms:  Negative except as documented in HPI   Eye " symptoms:  Negative except as documented in HPI   ENMT symptoms:  Negative except as documented in HPI.   Respiratory symptoms:  Denies cough.   Cardiovascular symptoms:  Chest pain.   Gastrointestinal symptoms:  Negative except as documented in HPI   Musculoskeletal symptoms:  Negative except as documented in HPI.   Neurologic symptoms:  Negative except as documented in HPI   Psychiatric symptoms:  Negative except as documented in HPI.             Additional review of systems information: All other systems reviewed and otherwise negative.      Health Status   Allergies:    Allergic Reactions (Selected)  No Known Allergies.   Medications:  (Selected)   Documented Medications  Documented  AMLODIPINE BESYLATE 10 MG TAB: 10 mg = 1 tab(s), Oral, Daily  Aspir-Low 81 mg oral delayed release tablet: 0 Refill(s)  CLOPIDOGREL 75 MG TABLET: 75 mg = 1 tab(s), Oral, Daily  DONEPEZIL HCL 10 MG TABLET: 10 mg = 1 tab(s), Oral, Daily  Fergon 240 mg (27 mg elemental iron) oral tablet: 240 mg = 1 tab(s), Oral, Daily, # 30 tab(s), 0 Refill(s)  Fluzone High-Dose Quadrivalent 0223-3020 intramuscular suspension: 0.7 mL, IM, Once  GABAPENTIN 300 MG CAPSULE: 300 mg = 1 cap(s), Oral, TID  ISOSORBIDE MN ER 30 MG TABLET: 30 mg = 1 tab(s), Oral, Daily  KLOR-CON M20 TABLET: 20 mEq = 1 tab(s), Oral, Daily  LOSARTAN POTASSIUM 100 MG TAB: 100 mg = 1 tab(s), Oral, Daily  Levemir FlexTouch 100 units/mL subcutaneous solution: Subcutaneous  Nitrostat 0.4 mg sublingual tab: 0.4 mg = 1 tab(s), SL, q5min, PRN PRN for chest pain, # 100 tab(s), 0 Refill(s)  PANTOPRAZOLE SOD DR 40 MG TAB: 40 mg = 1 tab(s), Oral, Daily  ROSUVASTATIN CALCIUM 20 MG TAB: 20 mg = 1 tab(s), Oral, Daily  Sudafed: See Instructions, Oral q6hr, 0 Refill(s)  doxepin 100 mg oral capsule: See Instructions, 2 cap(s) Oral Once a day (at bedtime), 0 Refill(s)  glimepiride 4 mg oral tablet: 4 mg = 1 tab(s), Oral, qAM  risperidone 2 mg oral tablet: 2 mg = 1 tab(s), Oral, BID  torsemide 20 mg  oral tablet: 20 mg = 1 tab(s), Oral, Daily.      Past Medical/ Family/ Social History   Medical history:    Resolved  Heart attack ([id number]):  Resolved.  CHF (congestive heart failure) ([id number]):  Resolved.  Diabetes ([id number]):  Resolved.  Renal disease ([id number]):  Resolved.  Breast cancer ([id number]):  Resolved.  Hypercholesteremia ([id number]-50K9Y3Q97P75):  Resolved.  Hypertension ([id number]--L9V2-H7WO7HQ29X54):  Resolved., CAD.   Surgical history:    Cataract Extraction Phacoemulsification (Left) on 11/12/2020 at 85 Years.  Comments:  11/12/2020 9:55 Vicky Ng RN  auto-populated from documented surgical case  IOL Placement (Left) on 11/12/2020 at 85 Years.  Comments:  11/12/2020 9:55 Vicky Ng RN  auto-populated from documented surgical case  Angiogram ([id number]).  Mastectomy ([id number]).  Appendectomy (51936).  Stent placement ([id number]).  Endovenous laser ablation of varicose vein of lower limb ([id number])., Cardiac stents 3 years ago..   Family history:    No family history items have been selected or recorded..   Social history:    Social & Psychosocial Habits    Alcohol  06/30/2014 Risk Assessment: Denies Alcohol Use      Comment: denhina - 07/10/2016 11:Bela Kay RN    Home/Environment  01/03/2019  Lives with: Spouse    Sexual  11/20/2020  What is your current gender identity? (Check all that apply) Identifies as female    Substance Use  06/30/2014 Risk Assessment: Denies Substance Abuse      Comment: denies - 07/10/2016 11:Bela Kay RN    Tobacco  06/30/2014 Risk Assessment: Denies Tobacco Use    12/31/2020  Use: Never (less than 100 in l    Patient Wants Consult For Cessation Counseling N/A    Abuse/Neglect  12/31/2020  SHX Any signs of abuse or neglect No  .      Physical Examination               Vital Signs    Vital Signs   12/31/2020 23:00 CST     Peripheral Pulse Rate     69 bpm                             Heart Rate Monitored      69 bpm                             Respiratory Rate          18 br/min                             SpO2                      97 %                             Oxygen Therapy            Room air                             Systolic Blood Pressure   157 mmHg  HI                             Diastolic Blood Pressure  82 mmHg                             Mean Arterial Pressure, Cuff              107 mmHg    12/31/2020 20:52 CST     Temperature Oral          37 DegC                             Temperature Oral (calculated)             98.60 DegF                             Peripheral Pulse Rate     81 bpm                             Respiratory Rate          20 br/min                             SpO2                      95 %                             Oxygen Therapy            Room air                             Systolic Blood Pressure   138 mmHg                             Diastolic Blood Pressure  74 mmHg                             Mean Arterial Pressure, Cuff              95 mmHg  .   Measurements   12/31/2020 20:52 CST     Weight Dosing             91.5 kg                             Weight Measured and Calculated in Lbs     201.72 lb                             Weight Estimated          91.5 kg                             Height/Length Dosing      160 cm                             Height/Length Estimated   160 cm                             Body Mass Index Estimated 35.74 kg/m2    12/31/2020 9:00 CST      Weight Dosing             90.000 kg                             Weight Measured           90 kg                             Weight Measured and Calculated in Lbs     198.41 lb                             Weight Estimated          90 kg                             Height/Length Dosing      152.00 cm                             Height/Length Measured    152 cm                              Height/Length Estimated   152 cm                             BSA Measured              1.95 m2                             BSA Estimated             1.95 m2                             Body Mass Index Estimated 38.95 kg/m2                             Body Mass Index Measured  38.95 kg/m2  .   Basic Oxygen Information   12/31/2020 23:00 CST     SpO2                      97 %                             Oxygen Therapy            Room air    12/31/2020 22:20 CST     SpO2                      98 %                             SpO2                      99 %                             Oxygen Therapy            Room air  General:  Alert, no acute distress, well appearing, conversant   Neurological:  Alert and oriented to person, place, time, and situation, No focal neurological deficit observed, CN II-XII intact, normal sensory observed, normal motor observed, normal speech observed, normal coordination observed.    Skin:  Warm, dry, intact   Head:  Normocephalic, atraumatic   Neck:  Supple, trachea midline   Eye:  Pupils are equal, round and reactive to light, extraocular movements are intact, normal conjunctiva   Ears, nose, mouth and throat:  Oral mucosa moist   Cardiovascular:  Regular rate and rhythm, No murmur, Normal peripheral perfusion, No edema   Respiratory:  Lungs are clear to auscultation, respirations are non-labored, breath sounds are equal, Symmetrical chest wall expansion.    Chest wall:  Mild right upper chest tenderness.   Musculoskeletal:  Normal ROM, normal strength, no tenderness, no swelling, no deformity.    Gastrointestinal:  Soft, Nontender, Non distended   Psychiatric:  Cooperative, appropriate mood & affect, normal judgment      Medical Decision Making   Documents reviewed:  Emergency department nurses' notes.   Orders  Launch Order Profile (Selected)   Inpatient Orders  Ordered  EKG Adult 12 Lead: 12/31/20 20:54:00 CST, Stat, Once, 12/31/20 20:54:00 CST, Stretcher, Standard Precautions, -1, -1,  12/31/20 20:54:00 CST  Ordered (Exam Completed)  XR Chest 2 Views: Stat, 12/31/20 20:55:00 CST, Chest Pain, None, Stretcher, Rad Type, Not Scheduled, 12/31/20 20:55:00 CST  Completed  Automated Diff: Now collect, 12/31/20 21:08:00 CST, Blood, Collected, Stop date 12/31/20 21:08:00 CST, Lab Collect, Print Label By Order Location, 12/31/20 20:55:00 CST  BNP: Stat collect, 12/31/20 20:55:00 CST, Blood, Stop date 12/31/20 20:56:00 CST, Lab Collect, Print Label By Order Location, 12/31/20 20:55:00 CST  CBC w/ Auto Diff: Now collect, 12/31/20 20:55:00 CST, Blood, Stop date 12/31/20 20:56:00 CST, Lab Collect, Print Label By Order Location, 12/31/20 20:55:00 CST  CMP: Stat collect, 12/31/20 20:55:00 CST, Blood, Stop date 12/31/20 20:56:00 CST, Lab Collect, Print Label By Order Location, 12/31/20 20:55:00 CST  Estimated Glomerular Filtration Rate: Stat collect, 12/31/20 21:08:00 CST, Blood, Collected, Stop date 12/31/20 21:08:00 CST, Lab Collect, Print Label By Order Location, 12/31/20 20:55:00 CST  POC BNP iSTAT request: Blood, Routine collect, 12/31/20 20:55:00 CST by Zenaida Bill, Stop date 12/31/20 22:00:00 CST, Lab Collect, Print Label By Order Location  POC BNP iSTAT: Blood, Stat collect, Collected, 12/31/20 22:04:12 CST  POC Istat ER Troponin: Blood, Stat collect, Collected, 12/31/20 22:09:39 CST  POC iSTAT ER Troponin request: Blood, Stat collect, 12/31/20 20:54:00 CST by Zenaida Bill, Stop date 12/31/20 20:56:00 CST, Lab Collect, Print Label By Order Location  Troponin-I: Stat collect, 12/31/20 20:55:00 CST, Blood, Stop date 12/31/20 20:56:00 CST, Lab Collect, Print Label By Order Location, 12/31/20 20:55:00 CST.   Electrocardiogram:  Time 12/31/2020 20:51:00, rate 81, normal sinus rhythm, No ST-T changes, no ectopy, EP Interp, P wave and HI interval Short HI interval..    Results review:  Lab results : Lab View   12/31/2020 22:09 CST     POC Troponin              0.01 ng/mL    12/31/2020  22:04 CST     POC BNP iSTAT             123 pg/mL  HI    12/31/2020 21:54 CST     Est Creat Clearance Ser   18.92 mL/min    12/31/2020 21:08 CST     Sodium Lvl                142 mmol/L                             Potassium Lvl             3.9 mmol/L                             Chloride                  103 mmol/L                             CO2                       25 mmol/L                             Calcium Lvl               8.8 mg/dL                             Glucose Lvl               149 mg/dL  HI                             BUN                       27.9 mg/dL  HI                             Creatinine                1.83 mg/dL  HI                             eGFR-AA                   34  NA                             eGFR-ELI                  28 mL/min/1.73 m2  NA                             Bili Total                0.6 mg/dL                             Bili Direct               0.3 mg/dL                             Bili Indirect             0.30 mg/dL                             AST                       28 unit/L                             ALT                       35 unit/L                             Alk Phos                  151 unit/L  HI                             Total Protein             7.8 gm/dL  HI                             Albumin Lvl               3.1 gm/dL  LOW                             Globulin                  4.7 gm/dL  HI                             A/G Ratio                 0.7 ratio  LOW                             BNP                       125.3 pg/mL  HI                             Troponin-I                0.015 ng/mL                             WBC                       6.0 x10(3)/mcL                             RBC                       3.78 x10(6)/mcL  LOW                             Hgb                       10.4 gm/dL  LOW                             Hct                       32.1 %  LOW                             Platelet                  172 x10(3)/mcL                              MCV                       84.9 fL                             MCH                       27.5 pg                             MCHC                      32.4 gm/dL  LOW                             RDW                       15.8 %                             MPV                       11.3 fL                             Abs Neut                  4.21 x10(3)/mcL                             Neutro Auto               71 %  NA                             Lymph Auto                15 %  NA                             Mono Auto                 11 %  NA                             Eos Auto                  3 %  NA                             Abs Eos                   0.2 x10(3)/mcL                             Basophil Auto             0 %  NA                             Abs Neutro                4.21 x10(3)/mcL                             Abs Lymph                 0.9 x10(3)/mcL                             Abs Mono                  0.7 x10(3)/mcL                             Abs Baso                  0.0 x10(3)/mcL  .   Chest X-Ray:  No acute disease process, interpretation by Emergency Physician.       Impression and Plan   Diagnosis   Chest pain ([id number]-PO R07.9)      Calls-Consults   -  1/1/2021 00:07:00 , Zara RUSHING, Delphine Machado..    Plan   Condition: Stable.    Disposition: Admit.    Counseled: Patient, Family, Regarding diagnosis, Regarding diagnostic results, Regarding treatment plan, Patient indicated understanding of instructions, Family understood..    Notes: I, Josafat Vu, acted solely as a scribe for and in the presence of Dr. Zafar who performed the service..       Addendum      Teaching-Supervisory Addendum-Brief   Notes: I, Dr. Zafar, personally performed the services described in this documentation as scribed in my presence and it is both accurate and complete..

## 2022-04-30 NOTE — OP NOTE
Patient:   Ina Marina            MRN: [id number]            FIN: [id number]               Age:   85 years     Sex:  Female     :  1935   Associated Diagnoses:   None   Author:   Ada Dorsey MD              OPERATIVE NOTE -- OPHTHALMOLOGY SERVICE    DATE: 2021    SURGEON: Ada Dorsey MD    ATTENDING: Miah Max MD    PRE-OPERATIVE DIAGNOSIS: Visually significant cataract, right eye    POST-OPERATIVE DIAGNOSIS: Visually significant cataract, right eye     PROCEDURES: Phacoemulsification with intraocular lens, right eye    IMPLANT: MX60E +26.5 D    ANESTHESIA: MAC    COMPLICATIONS: None    ESTIMATED BLOOD LOSS: < 5 cc    INDICATION:    The patient has a history of painless progressive visual loss and difficulty with activities of daily living secondary to cataract formation. After a thorough discussion of the risks, benefits and alternatives to cataract surgery, including, but not limited to, the rare risks of infection, retinal detachment, need for additional surgery, loss of vision and even loss of the eye, the patient voices good understanding and desires to proceed. Written informed consent was confirmed and witnessed prior to surgery.    PROCEDURE IN DETAIL:    The patients IOL calculations and lens selection were reviewed and confirmed. After verification and marking of the proper eye in the preop holding area, several sets of 1% tropicamide and 2.5% phenylephrine drops were then placed.    The patient was brought to the operating room in supine position where the eye was prepped and draped in standard sterile fashion using 5% betadine. A lid speculum was placed. Topical tetracaine was used in addition to the preoperative anesthesia. A paracentesis incision was created through which lidocaine followed followed by viscoelastic was used to fill the anterior chamber. A 2.5 mm keratome blade was used to create a triplanar temporal clear corneal incision. A Malyugin ring was  inserted. A cystotome and Utrata forceps were then used to fashion a continuous curvilinear capsulorrhexis. Hydrodissection with BSS was performed. Phacoemulsification of the nucleus was carried out using the divide and conquer technique, and all remaining epinuclear and cortical material was removed. The eye was reformed using viscoelastic. The above listed intraocular lens was implanted into the capsular bag successfully. The malyugin ring was removed from the eye. All remaining viscoelastic was removed from the eye. At the end of the case, the lens was well-centered and both wounds were found to be watertight.    Drops of Vigamox and Pred Forte were instilled, the patient was cleaned off, and an eye shield placed over the eye. The patient tolerated the procedure well and was taken to the recovery area in stable condition. The patient was instructed to follow-up for routine post-operative care the following morning.

## 2022-05-02 NOTE — HISTORICAL [facility] CERNER
This is a historical note converted from Scar. Formatting and pictures may have been removed.  Please reference Scar for original formatting and attached multimedia. ?  OPERATIVE NOTE -- OPHTHALMOLOGY SERVICE  ?   DATE: 11/12/2020  ?   SURGEON:?Calixto Butcher?MD  ?   ATTENDING:?BECKA Alberto  ?  PRE-OPERATIVE DIAGNOSIS: Visually significant cataract,?left eye  ?  POST-OPERATIVE DIAGNOSIS: Visually significant cataract,?left eye  ?  PROCEDURES: Phacoemulsification with intraocular lens,?left eye  ?  IMPLANT: MX60E +25.0 Diopters  ?  ANESTHESIA: MAC with topical lidocaine  ?  COMPLICATIONS: Anterior capsular rent  ?  ESTIMATED BLOOD LOSS: < 5 cc  ?  INDICATION:  ?  The patient has a history of painless progressive visual loss and difficulty with activities of daily living secondary to cataract formation. After a thorough discussion of the risks, benefits and alternatives to cataract surgery, including, but not limited to, the rare risks of infection, retinal detachment, need for additional surgery, loss of vision and even loss of the eye, the patient voices good understanding and desires to proceed. Written informed consent was confirmed and witnessed prior to surgery.  ?  PROCEDURE IN DETAIL:  ?  The patient?s IOL calculations and lens selection were reviewed and confirmed. After verification and marking of the proper eye in the preop holding area, several sets of 1% tropicamide and 2.5% phenylephrine drops were then placed.  ?  The patient was brought to the operating room in supine position where the eye was prepped and draped in standard sterile fashion using 5% betadine. A lid speculum placed. Topical tetracaine was used in addition to the preoperative anesthesia. A paracentesis incision was created through which lidocaine followed by viscoelastic was used to fill the anterior chamber. A 2.4mm keratome blade was used to create a triplanar temporal clear corneal incision. A Malyugin ring was placed without  complications. A cystotome and Utrata forceps was then used to fashion a continuous curvilinear capsulorrhexis. Hydrodissection with BSS was performed using a?flat hydrodissection cannula. Phacoemulsification of the nucleus was carried out with a divide and conquer technique, and all remaining epinuclear and cortical material was removed. The eye was reformed using viscoelastic. At this point an anterior capsular rent was noted.?The above listed intraocular lens was implanted into the capsular bag successfully. All remaining viscoelastic was removed from the eye. The Malyugin ring was removed without complications. At the end of the case, the lens was well-centered and all wounds were found to be watertight.  ?  Drops of Vigamox and Pred Forte were instilled and an eye shield placed over the eye. The patient tolerated the procedure well and was taken to the recovery area in stable condition. The patient was instructed to follow-up for routine post-operative care the following morning.  ?  The attending surgeon was present, supervising, and assisting as necessary during the entire surgery.   I was scrubbed for the entire procedure  ?  CPT: [id number]